# Patient Record
Sex: MALE | Race: OTHER
[De-identification: names, ages, dates, MRNs, and addresses within clinical notes are randomized per-mention and may not be internally consistent; named-entity substitution may affect disease eponyms.]

---

## 2018-04-14 ENCOUNTER — HOSPITAL ENCOUNTER (EMERGENCY)
Dept: HOSPITAL 14 - H.ER | Age: 78
Discharge: HOME | End: 2018-04-14
Payer: SELF-PAY

## 2018-04-14 VITALS
TEMPERATURE: 98.1 F | HEART RATE: 80 BPM | SYSTOLIC BLOOD PRESSURE: 151 MMHG | DIASTOLIC BLOOD PRESSURE: 57 MMHG | OXYGEN SATURATION: 100 % | RESPIRATION RATE: 16 BRPM

## 2018-04-14 DIAGNOSIS — S69.91XA: ICD-10-CM

## 2018-04-14 DIAGNOSIS — S00.81XA: Primary | ICD-10-CM
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
## 2018-04-14 DIAGNOSIS — S00.83XA: ICD-10-CM
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
## 2018-04-14 NOTE — CT
PROCEDURE:  CT MAXILLOFACIAL BONES WITHOUT CONTRAST



HISTORY:

trauma



COMPARISON:

None



TECHNIQUE:

Contiguous axial CT  images of the maxillofacial bones were obtained. 

Coronal and sagittal reformats were generated.



Radiation dose:



Total exam DLP =  mGy-cm.



This CT exam was performed using one or more of the following dose 

reduction techniques: Automated exposure control, adjustment of the 

mA and/or kV according to patient size, and/or use of iterative 

reconstruction technique.



FINDINGS:



NASAL BONES:

Unremarkable.



ORBITS:

Unremarkable.



PARANASAL SINUSES/ MASTOIDS:

Clear.



MAXILLA:

Unremarkable.



MANDIBLE/ TEMPOROMANDIBULAR JOINTS:

Unremarkable.



SKULL BASE:

Unremarkable.



TEMPORAL BONES:

Middle ears and mastoid grossly unremarkable.



OTHER FINDINGS:

None.



IMPRESSION:

Unremarkable non contrast enhanced CT of the maxillofacial bones.

## 2018-04-14 NOTE — ED PDOC
HPI: General Adult


Time Seen by Provider: 04/14/18 12:51


Chief Complaint (Nursing): Trauma


Chief Complaint (Provider): Fall, Head Injury


History Per: Patient


History/Exam Limitations: no limitations


Onset/Duration Of Symptoms: Mins (PTA)


Current Symptoms Are (Timing): Still Present


Additional History Per: EMS


Additional Complaint(s): 





Binu is a 77 y/o male with no past medical history brought to the ED via 

EMS after tripping and falling while walking prior to arrival. Patient states 

that he hit his face but did not lose consciousness, and he denies any 

dizziness before the fall. Patient complains of pain on the right side of his 

face where he sustained most of the impact of the fall. He has no other medical 

complaints. He does not remember date of last Tetanus.





PMD: None





Past Medical History


Reviewed: Historical Data, Nursing Documentation, Vital Signs


Vital Signs: 


 Last Vital Signs











Temp  98.1 F   04/14/18 12:49


 


Pulse  80   04/14/18 12:49


 


Resp  16   04/14/18 12:49


 


BP  151/57 H  04/14/18 12:49


 


Pulse Ox  100   04/14/18 14:17














- Medical History


PMH: No Chronic Diseases





- Family History


Family History: States: Unknown Family Hx





- Immunization History


Hx Tetanus Toxoid Vaccination: No (Unknown)





- Allergies


Allergies/Adverse Reactions: 


 Allergies











Allergy/AdvReac Type Severity Reaction Status Date / Time


 


No Known Allergies Allergy   Verified 04/14/18 12:49














Review of Systems


ROS Statement: Except As Marked, All Systems Reviewed And Found Negative


Musculoskeletal: Positive for: Other (Facial Pain, Right side)





Physical Exam





- Reviewed


Nursing Documentation Reviewed: Yes


Vital Signs Reviewed: Yes





- Physical Exam


Appears: Positive for: Well, Non-toxic, No Acute Distress


Head Exam: Positive for: NORMAL INSPECTION, NORMOCEPHALIC.  Negative for: 

ATRAUMATIC (abrasions on right maxillary region and chin)


Skin: Positive for: Normal Color, Warm, Dry


Extremity: Positive for: Normal ROM.  Negative for: Pedal Edema, Deformity


Neurologic/Psych: Positive for: Alert, Oriented, Gait (steady).  Negative for: 

Motor/Sensory Deficits





- ECG


O2 Sat by Pulse Oximetry: 100 (RA)


Pulse Ox Interpretation: Normal





- Other Rad


  ** CT head and facial bones


X-Ray: Read By Radiologist


X-Ray Interpretation: no acute finding





Medical Decision Making


Medical Decision Making: 





Time: 13:00


Initial Impression: 77 y/o male with head injury status post fall


Initial Plan:


--CT Head w/o Contrast


--CT Maxillofacial w/o Contrast


--XR Left Hand 3 Views


--XR Right Hand 3 Views


--Adacel


--Tylenol





Patient with CT findings, all questions answered. Advised Tylenol for pain. 

Patient was referred to clinic for follow up.


--------------------------------------------------------------------------------

---------------


Scribe Attestation:


Documented by Kyler Nunez, acting as a scribe for Rosie Vallecillo PA-C.





Provider Scribe Attestation:


All medical record entries made by the Scribe were at my direction and 

personally dictated by me. I have reviewed the chart and agree that the record 

accurately reflects my personal performance of the history, physical exam, 

medical decision making, and the department course for this patient. I have 

also personally directed, reviewed, and agree with the discharge instructions 

and disposition.





Disposition





- Clinical Impression


Clinical Impression: 


 Facial abrasion, Head injury, Requires a booster tetanus, Facial contusion








- Patient ED Disposition


Is Patient to be Admitted: No


Counseled Patient/Family Regarding: Studies Performed, Diagnosis, Need For 

Followup





- Disposition


Referrals: 


[facility] [Outside]


Disposition: Routine/Home


Disposition Time: 15:04


Condition: STABLE


Additional Instructions: 


Tylenol for pain as needed. Follow up with clinic in 2-3 days.


Instructions:  Closed Head Injury (DC), Contusion (DC), Skin Abrasions (DC), 

Diphtheria and Tetanus Toxoids, and Acellular Pertussis Vaccine


Forms:  [employer] (Turkish)


Print Language: Luxembourgish

## 2018-04-14 NOTE — RAD
PROCEDURE:  Left Hand Radiographs.



HISTORY:

trauma  



COMPARISON:

None.



FINDINGS:



BONES:

Normal. No fracture. 



JOINTS:

Normal. No osteoarthritic changes. 



SOFT TISSUES:

Normal. 



OTHER FINDINGS:

None.



IMPRESSION:

Normal left hand radiographs.

## 2018-04-14 NOTE — CT
PROCEDURE:  CT HEAD WITHOUT CONTRAST.



HISTORY:

trauma



COMPARISON:

None available. 



TECHNIQUE:

Axial computed tomography images were obtained through the head/brain 

without intravenous contrast.  



Radiation dose:



Total exam DLP =  mGy-cm.



This CT exam was performed using one or more of the following dose 

reduction techniques: Automated exposure control, adjustment of the 

mA and/or kV according to patient size, and/or use of iterative 

reconstruction technique.



FINDINGS:



HEMORRHAGE:

No intracranial hemorrhage. 



BRAIN:

No mass effect or edema.  No atrophy or chronic microvascular 

ischemic changes.



VENTRICLES:

Unremarkable. No hydrocephalus. 



CALVARIUM:

Unremarkable.



PARANASAL SINUSES:

Unremarkable as visualized. No significant inflammatory changes.



MASTOID AIR CELLS:

Unremarkable as visualized. No inflammatory changes.



OTHER FINDINGS:

None.



IMPRESSION:

Normal CT of the Head.

## 2018-04-15 NOTE — RAD
HISTORY:

trauma  



COMPARISON:

No prior



FINDINGS:



BONES:

Normal. No fracture.



JOINTS:

Normal. No osteoarthritis.



SOFT TISSUE:

Normal.



OTHER FINDINGS:

None .



IMPRESSION:

Normal Bone Xray.

## 2019-04-28 ENCOUNTER — HOSPITAL ENCOUNTER (OUTPATIENT)
Dept: HOSPITAL 14 - H.ER | Age: 79
Setting detail: OBSERVATION
LOS: 1 days | Discharge: HOME | End: 2019-04-29
Attending: GENERAL ACUTE CARE HOSPITAL | Admitting: GENERAL ACUTE CARE HOSPITAL
Payer: MEDICAID

## 2019-04-28 DIAGNOSIS — R07.89: Primary | ICD-10-CM

## 2019-04-28 DIAGNOSIS — R97.20: ICD-10-CM

## 2019-04-28 DIAGNOSIS — I35.2: ICD-10-CM

## 2019-04-28 DIAGNOSIS — Z79.82: ICD-10-CM

## 2019-04-28 DIAGNOSIS — I50.41: ICD-10-CM

## 2019-04-28 LAB
ALBUMIN SERPL-MCNC: 3.6 G/DL (ref 3.5–5)
ALBUMIN/GLOB SERPL: 1.3 {RATIO} (ref 1–2.1)
ALT SERPL-CCNC: 32 U/L (ref 21–72)
AST SERPL-CCNC: 39 U/L (ref 17–59)
BACTERIA #/AREA URNS HPF: (no result) /[HPF]
BASOPHILS # BLD AUTO: 0.1 K/UL (ref 0–0.2)
BASOPHILS NFR BLD: 1.1 % (ref 0–2)
BILIRUB UR-MCNC: NEGATIVE MG/DL
BNP SERPL-MCNC: 3130 PG/ML (ref 0–900)
BUN SERPL-MCNC: 23 MG/DL (ref 9–20)
CALCIUM SERPL-MCNC: 8.6 MG/DL (ref 8.4–10.2)
COLOR UR: YELLOW
EOSINOPHIL # BLD AUTO: 0.1 K/UL (ref 0–0.7)
EOSINOPHIL NFR BLD: 2.4 % (ref 0–4)
ERYTHROCYTE [DISTWIDTH] IN BLOOD BY AUTOMATED COUNT: 13.6 % (ref 11.5–14.5)
GFR NON-AFRICAN AMERICAN: > 60
GLUCOSE UR STRIP-MCNC: (no result) MG/DL
HGB BLD-MCNC: 12.8 G/DL (ref 12–18)
LEUKOCYTE ESTERASE UR-ACNC: (no result) LEU/UL
LIPASE SERPL-CCNC: 64 U/L (ref 23–300)
LYMPHOCYTES # BLD AUTO: 1.3 K/UL (ref 1–4.3)
LYMPHOCYTES NFR BLD AUTO: 26.7 % (ref 20–40)
MCH RBC QN AUTO: 30 PG (ref 27–31)
MCHC RBC AUTO-ENTMCNC: 33.6 G/DL (ref 33–37)
MCV RBC AUTO: 89.3 FL (ref 80–94)
MONOCYTES # BLD: 0.4 K/UL (ref 0–0.8)
MONOCYTES NFR BLD: 7.7 % (ref 0–10)
NEUTROPHILS # BLD: 3 K/UL (ref 1.8–7)
NEUTROPHILS NFR BLD AUTO: 62.1 % (ref 50–75)
NRBC BLD AUTO-RTO: 0.1 % (ref 0–0)
PH UR STRIP: 6 [PH] (ref 5–8)
PLATELET # BLD: 159 K/UL (ref 130–400)
PMV BLD AUTO: 8.2 FL (ref 7.2–11.7)
PROT UR STRIP-MCNC: NEGATIVE MG/DL
RBC # BLD AUTO: 4.28 MIL/UL (ref 4.4–5.9)
RBC # UR STRIP: (no result) /UL
SP GR UR STRIP: 1.01 (ref 1–1.03)
SQUAMOUS EPITHIAL: < 1 /HPF (ref 0–5)
URINE CLARITY: CLEAR
UROBILINOGEN UR-MCNC: (no result) MG/DL (ref 0.2–1)
WBC # BLD AUTO: 4.9 K/UL (ref 4.8–10.8)

## 2019-04-28 PROCEDURE — 85025 COMPLETE CBC W/AUTO DIFF WBC: CPT

## 2019-04-28 PROCEDURE — 84153 ASSAY OF PSA TOTAL: CPT

## 2019-04-28 PROCEDURE — 80061 LIPID PANEL: CPT

## 2019-04-28 PROCEDURE — 84443 ASSAY THYROID STIM HORMONE: CPT

## 2019-04-28 PROCEDURE — 84484 ASSAY OF TROPONIN QUANT: CPT

## 2019-04-28 PROCEDURE — 93306 TTE W/DOPPLER COMPLETE: CPT

## 2019-04-28 PROCEDURE — 83690 ASSAY OF LIPASE: CPT

## 2019-04-28 PROCEDURE — 80048 BASIC METABOLIC PNL TOTAL CA: CPT

## 2019-04-28 PROCEDURE — 83735 ASSAY OF MAGNESIUM: CPT

## 2019-04-28 PROCEDURE — 83880 ASSAY OF NATRIURETIC PEPTIDE: CPT

## 2019-04-28 PROCEDURE — 93005 ELECTROCARDIOGRAM TRACING: CPT

## 2019-04-28 PROCEDURE — 36415 COLL VENOUS BLD VENIPUNCTURE: CPT

## 2019-04-28 PROCEDURE — 84100 ASSAY OF PHOSPHORUS: CPT

## 2019-04-28 PROCEDURE — 80053 COMPREHEN METABOLIC PANEL: CPT

## 2019-04-28 PROCEDURE — 99285 EMERGENCY DEPT VISIT HI MDM: CPT

## 2019-04-28 PROCEDURE — 81003 URINALYSIS AUTO W/O SCOPE: CPT

## 2019-04-28 PROCEDURE — 82550 ASSAY OF CK (CPK): CPT

## 2019-04-28 PROCEDURE — 82948 REAGENT STRIP/BLOOD GLUCOSE: CPT

## 2019-04-28 PROCEDURE — 71046 X-RAY EXAM CHEST 2 VIEWS: CPT

## 2019-04-28 RX ADMIN — PANTOPRAZOLE SODIUM SCH MG: 40 TABLET, DELAYED RELEASE ORAL at 14:51

## 2019-04-28 NOTE — CP.PCM.HP
<Kusum Wright - Last Filed: 04/28/19 14:35>





History of Present Illness





- History of Present Illness


History of Present Illness: 





80 y/o male with no significant medical history presented to ED with complaint 

of body pain, dizziness, chest pain, and difficulty breathing for several month 

duration. He states his chest pain is intermittent, 5/10, located at center of 

chest and radiates to the abdomen and is associated with dyspnea. He states that

lifting heavy boxes at work exacerbate symptoms and states that sometimes he 

awakens from his sleep because he cant catch his breath. He uses one pillow at 

night and states he can walk more than 10 blocks and does not get short of 

breath or have chest pain. Also reports cough and muscle aches, states there is 

a sick contact at home. Has not seen a doctor in more than 10 years. Denies 

fevers, chills, vomiting, diarrhea, blood in stool, dysuria, frequency, and 

urgency. 





PMD: None


Medical history: none


Medications: None


Surgical history: none


Social history: Denies smoking history, illicit drug use or alcohol use. 





ED: V/S: BP: 98.5 ; HR: 74 ; 139/52 ; O2 Saturation: 97% on room air 


EKG: sinus rhythm; right bundle branch block (incomplete)


CBC: 4.9 > 12.8 / 38.2 < 159


CXR: Interstitial markings are increased and coarsened with somewhat nodular 

appearance; rule out sequela of reactive/ inflammatory airway disease or vial 

illness verses interstitial pneumonia


Cardiac Monitor


Glucose, Blood, POC: 143


UA: Negative 


Pro-BNP: 3130


TSH: 0.99


PSA: 11.3





Present on Admission





- Present on Admission


Any Indicators Present on Admission: No





Past Patient History





- Past Social History


Alcohol: None


Drugs: Denies





- PSYCHIATRIC


Hx Substance Use: No





- SURGICAL HISTORY


Hx Surgeries: No





- ANESTHESIA


Hx Anesthesia: No





Meds


Allergies/Adverse Reactions: 


                                    Allergies











Allergy/AdvReac Type Severity Reaction Status Date / Time


 


No Known Allergies Allergy   Verified 04/14/18 12:49














Physical Exam





- Constitutional


Appears: Non-toxic, No Acute Distress





- ENT Exam


ENT Exam: Mucous Membranes Moist, Normal Exam





- Neck Exam


Neck exam: Positive for: Normal Inspection





- Respiratory Exam


Respiratory Exam: Clear to Auscultation Bilateral, NORMAL BREATHING PATTERN.  

absent: Accessory Muscle Use, Chest Wall Tenderness, Decreased Breath Sounds, 

Prolonged Expiratory Phase, Rales, Rhonchi, Wheezes, Respiratory Distress, 

Stridor





- Cardiovascular Exam


Cardiovascular Exam: REGULAR RHYTHM, RRR, +S1, +S2.  absent: JVD





- GI/Abdominal Exam


GI & Abdominal Exam: Normal Bowel Sounds, Soft.  absent: Diminished Bowel 

Sounds, Distended, Firm, Hyperactive Bowel Sounds, Rebound, Rigid, Tenderness





- Extremities Exam


Extremities exam: Positive for: normal capillary refill, normal inspection, 

pedal pulses present (+2 dorsalis pedis and +2 tibialis posterior present 

bilateral).  Negative for: calf tenderness, joint swelling, pedal edema, 

tenderness





- Back Exam


Back exam: NORMAL INSPECTION.  absent: CVA tenderness (L), CVA tenderness (R), 

tenderness





- Neurological Exam


Neurological exam: Alert, Oriented x3





- Psychiatric Exam


Psychiatric exam: Normal Affect, Normal Mood





- Skin


Skin Exam: Dry, Intact, Normal Color, Warm





Results





- Vital Signs


Recent Vital Signs: 





                                Last Vital Signs











Temp  98.5 F   04/28/19 10:51


 


Pulse  69   04/28/19 13:28


 


Resp  20   04/28/19 10:51


 


BP  139/52 L  04/28/19 10:51


 


Pulse Ox  97   04/28/19 13:28














- Labs


Result Diagrams: 


                                 04/28/19 11:18





                                 04/28/19 11:18


Labs: 





                         Laboratory Results - last 24 hr











  04/28/19 04/28/19 04/28/19





  11:09 11:18 11:18


 


WBC    4.9


 


RBC    4.28 L


 


Hgb    12.8


 


Hct    38.2


 


MCV    89.3


 


MCH    30.0


 


MCHC    33.6


 


RDW    13.6


 


Plt Count    159


 


MPV    8.2


 


Neut % (Auto)    62.1


 


Lymph % (Auto)    26.7


 


Mono % (Auto)    7.7


 


Eos % (Auto)    2.4


 


Baso % (Auto)    1.1


 


Neut # (Auto)    3.0


 


Lymph # (Auto)    1.3


 


Mono # (Auto)    0.4


 


Eos # (Auto)    0.1


 


Baso # (Auto)    0.1


 


Sodium   137 


 


Potassium   4.0 


 


Chloride   106 


 


Carbon Dioxide   24 


 


Anion Gap   11 


 


BUN   23 H 


 


Creatinine   0.7 L 


 


Est GFR ( Amer)   > 60 


 


Est GFR (Non-Af Amer)   > 60 


 


POC Glucose (mg/dL)  140 H  


 


Random Glucose   143 H 


 


Calcium   8.6 


 


Phosphorus   4.0 


 


Magnesium   2.0 


 


Total Bilirubin   0.6 


 


AST   39 


 


ALT   32 


 


Alkaline Phosphatase   61 


 


Total Creatine Kinase   145 


 


Troponin I   0.0160 


 


NT-Pro-B Natriuret Pep   3130 H 


 


Total Protein   6.3 


 


Albumin   3.6 


 


Globulin   2.7 


 


Albumin/Globulin Ratio   1.3 


 


Lipase   64 


 


Prostate Specific Ag   11.3 H 


 


TSH 3rd Generation   0.99 














Assessment & Plan





- Assessment and Plan (Free Text)


Assessment: 





80 y/o male with no significant medical history presented to ED with general 

complaints, mainly of chest pain, admitted to rule out ACS and CHF as his Pro 

[id number]. 


Plan: 





1. Chest Pain r/o ACS


- r/o CHF- Pro-BNP :


- Admit to Telemetry


- S/P ASA and Nitroglycerin 0.4mg 


- Heart Healthy diet 


- F/U echo


- F/U Chest x-ray final reading


- F/U EKG final reading 


- F/U AM labs 





2. DVT prophylaxis


- SCD and Lovenox





3. Full code





<Mj Duran D - Last Filed: 04/28/19 15:25>





Results





- Vital Signs


Recent Vital Signs: 





                                Last Vital Signs











Temp  98.5 F   04/28/19 10:51


 


Pulse  69   04/28/19 14:58


 


Resp  16   04/28/19 14:53


 


BP  115/52 L  04/28/19 14:53


 


Pulse Ox  97   04/28/19 14:58














- Labs


Result Diagrams: 


                                 04/28/19 11:18





                                 04/28/19 11:18


Labs: 





                         Laboratory Results - last 24 hr











  04/28/19 04/28/19 04/28/19





  11:09 11:18 11:18


 


WBC    4.9


 


RBC    4.28 L


 


Hgb    12.8


 


Hct    38.2


 


MCV    89.3


 


MCH    30.0


 


MCHC    33.6


 


RDW    13.6


 


Plt Count    159


 


MPV    8.2


 


Neut % (Auto)    62.1


 


Lymph % (Auto)    26.7


 


Mono % (Auto)    7.7


 


Eos % (Auto)    2.4


 


Baso % (Auto)    1.1


 


Neut # (Auto)    3.0


 


Lymph # (Auto)    1.3


 


Mono # (Auto)    0.4


 


Eos # (Auto)    0.1


 


Baso # (Auto)    0.1


 


Sodium   137 


 


Potassium   4.0 


 


Chloride   106 


 


Carbon Dioxide   24 


 


Anion Gap   11 


 


BUN   23 H 


 


Creatinine   0.7 L 


 


Est GFR ( Amer)   > 60 


 


Est GFR (Non-Af Amer)   > 60 


 


POC Glucose (mg/dL)  140 H  


 


Random Glucose   143 H 


 


Calcium   8.6 


 


Phosphorus   4.0 


 


Magnesium   2.0 


 


Total Bilirubin   0.6 


 


AST   39 


 


ALT   32 


 


Alkaline Phosphatase   61 


 


Total Creatine Kinase   145 


 


Troponin I   0.0160 


 


NT-Pro-B Natriuret Pep   3130 H 


 


Total Protein   6.3 


 


Albumin   3.6 


 


Globulin   2.7 


 


Albumin/Globulin Ratio   1.3 


 


Lipase   64 


 


Prostate Specific Ag   11.3 H 


 


TSH 3rd Generation   0.99 


 


Urine Color   


 


Urine Clarity   


 


Urine pH   


 


Ur Specific Gravity   


 


Urine Protein   


 


Urine Glucose (UA)   


 


Urine Ketones   


 


Urine Blood   


 


Urine Nitrate   


 


Urine Bilirubin   


 


Urine Urobilinogen   


 


Ur Leukocyte Esterase   


 


Urine RBC (Auto)   


 


Urine Microscopic WBC   


 


Ur Squamous Epith Cells   


 


Urine Bacteria   














  04/28/19





  13:38


 


WBC 


 


RBC 


 


Hgb 


 


Hct 


 


MCV 


 


MCH 


 


MCHC 


 


RDW 


 


Plt Count 


 


MPV 


 


Neut % (Auto) 


 


Lymph % (Auto) 


 


Mono % (Auto) 


 


Eos % (Auto) 


 


Baso % (Auto) 


 


Neut # (Auto) 


 


Lymph # (Auto) 


 


Mono # (Auto) 


 


Eos # (Auto) 


 


Baso # (Auto) 


 


Sodium 


 


Potassium 


 


Chloride 


 


Carbon Dioxide 


 


Anion Gap 


 


BUN 


 


Creatinine 


 


Est GFR ( Amer) 


 


Est GFR (Non-Af Amer) 


 


POC Glucose (mg/dL) 


 


Random Glucose 


 


Calcium 


 


Phosphorus 


 


Magnesium 


 


Total Bilirubin 


 


AST 


 


ALT 


 


Alkaline Phosphatase 


 


Total Creatine Kinase 


 


Troponin I 


 


NT-Pro-B Natriuret Pep 


 


Total Protein 


 


Albumin 


 


Globulin 


 


Albumin/Globulin Ratio 


 


Lipase 


 


Prostate Specific Ag 


 


TSH 3rd Generation 


 


Urine Color  Yellow


 


Urine Clarity  Clear


 


Urine pH  6.0


 


Ur Specific Gravity  1.012


 


Urine Protein  Negative


 


Urine Glucose (UA)  Neg


 


Urine Ketones  Negative


 


Urine Blood  Moderate


 


Urine Nitrate  Negative


 


Urine Bilirubin  Negative


 


Urine Urobilinogen  0.2-1.0


 


Ur Leukocyte Esterase  Neg


 


Urine RBC (Auto)  20 H


 


Urine Microscopic WBC  < 1


 


Ur Squamous Epith Cells  < 1


 


Urine Bacteria  Few H














Attending/Attestation





- Attestation


I have personally seen and examined this patient.: Yes


I have fully participated in the care of the patient.: Yes


I have reviewed all pertinent clinical information: Yes


Notes (Text): 





04/28/19 15:24


Patient seen and examined with resident. Case discussed and agreed with 

assessment and plan of management

## 2019-04-28 NOTE — ED PDOC
HPI: General Adult


Time Seen by Provider: 04/28/19 10:42


Chief Complaint (Nursing): Chest Pain


Chief Complaint (Provider): Body Pain


History Per:  ([id number])


History/Exam Limitations: language barrier (poor historian)


Onset/Duration Of Symptoms: Other (several months)


Current Symptoms Are (Timing): Still Present


Additional Complaint(s): 


Patient is a 78 y/o  male with no significant PMHx who presents to the 

ED for evaluation of various physical complaints including body pain, dizziness,

chest pain, and difficulty breathing for several months. Patient claims his 

symptoms worsen when sleeping at night. Patient reports this is the first time 

he is being seen by a physician for his symptoms. Patient also notes mild cough,

headache, muscle ache, joint pain, and pain around his belly button. Patient 

denies vomiting, diarrhea, blood in stool, trouble urinating, unexplained weight

loss, and prior heart or lung problems. Of note, patient works in a factory 

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





PCP: None Provided





Past Medical History


Reviewed: Historical Data, Nursing Documentation, Vital Signs


Vital Signs: 





                                Last Vital Signs











Temp  98.5 F   04/28/19 10:51


 


Pulse  74   04/28/19 10:51


 


Resp  20   04/28/19 10:51


 


BP  139/52 L  04/28/19 10:51


 


Pulse Ox  97   04/28/19 10:51














- Medical History


PMH: No Chronic Diseases





- Surgical History


Surgical History: No Surg Hx





- Family History


Family History: States: No Known Family Hx





- Social History


Current smoker - smoking cessation education provided: No


Ex-Smoker (has not smoked in the last 12 months): No


Alcohol: None


Drugs: Denies





- Immunization History


Hx Tetanus Toxoid Vaccination: No (Unknown)





- Home Medications


Home Medications: 


                                Ambulatory Orders











 Medication  Instructions  Recorded


 


Aspirin [Aspirin Chewable] 81 mg PO DAILY  chew 04/29/19


 


Aspirin [Garrett Aspirin EC] 81 mg PO DAILY #30 tab 04/29/19


 


Lisinopril [Zestril] 2.5 mg PO DAILY #30 tab 04/29/19


 


Pantoprazole [Protonix EC Tab] 40 mg PO DAILY  ect 04/29/19














- Allergies


Allergies/Adverse Reactions: 


                                    Allergies











Allergy/AdvReac Type Severity Reaction Status Date / Time


 


No Known Allergies Allergy   Verified 04/14/18 12:49














Review of Systems


ROS Statement: Except As Marked, All Systems Reviewed And Found Negative


Constitutional: Negative for: Other (unexplained weight loss)


Cardiovascular: Positive for: Chest Pain


Respiratory: Positive for: Cough (mild), Other (dyspnea)


Gastrointestinal: Positive for: Abdominal Pain (around belly button).  Negative 

for: Vomiting, Diarrhea, Melena


Genitourinary Male: Negative for: Dysuria, Incontinence, Hematuria


Musculoskeletal: Positive for: Other (muscle ache and joint pain)


Neurological: Positive for: Headache, Dizziness





Physical Exam





- Reviewed


Nursing Documentation Reviewed: Yes





- Physical Exam


Appears: Positive for: No Acute Distress (elderly; poor historian)


Head Exam: Positive for: ATRAUMATIC, NORMAL INSPECTION, NORMOCEPHALIC


Skin: Positive for: Normal Color, Warm, DRY


Eye Exam: Positive for: EOMI, Normal appearance, PERRL


Neck: Positive for: Normal, Painless ROM, Supple


Cardiovascular/Chest: Positive for: Regular Rate, Rhythm.  Negative for: Murmur


Respiratory: Positive for: Normal Breath Sounds.  Negative for: Respiratory 

Distress


Gastrointestinal/Abdominal: Positive for: Normal Exam, Soft, Other (umbilicus 

unremarkable).  Negative for: Tenderness (focal), Mass (palpable)


Back: Positive for: Normal Inspection.  Negative for: L CVA Tenderness, R CVA 

Tenderness, Vertebral Tenderness


Extremity: Positive for: Normal ROM.  Negative for: Pedal Edema, Deformity


Neurological/Psych: Positive for: Alert, Symmetric/Intact Strength (5/5), 

Oriented (to self and hospital), Gait (steady)





- Laboratory Results


Result Diagrams: 


                                 04/29/19 04:20





                                 04/29/19 03:25





- ECG


ECG Rhythm: Positive for: Sinus Rhythm, Right Bundle Branch Block (incomplete), 

Nonspecific Changes


Rate: 69


O2 Sat by Pulse Oximetry: 97 (RA)


Pulse Ox Interpretation: Normal





Medical Decision Making


Medical Decision Making: 


Time: 1055


Plan: Given total body pain will consider malignancy vs rhabdomyolysis vs 

thyroid disease vs arthritis vs CAD vs other.


EKG


Blood Work


CBC


CXR


Cardiac Monitor


Glucose, Blood, POC


UA





Time: 1418


FINDINGS:





LUNGS:


The interstitial markings are increased and coarsened with a somewhat micro 

nodular appearance; rule out sequela of reactive/inflammatory airway disease or 

viral illness versus interstitial pneumonia.





PLEURA:


No significant pleural effusion identified. No pneumothorax apparent.





CARDIOVASCULAR:


No aortic atherosclerotic calcification present.





Heart is mildly enlarged..  No pulmonary vascular congestion. 





OSSEOUS STRUCTURES:


Minor multilevel degenerative spondylosis of the thoracic spine





VISUALIZED UPPER ABDOMEN:


Normal.





OTHER FINDINGS:


None.





IMPRESSION:


The interstitial markings are increased and coarsened with a somewhat 

micronodular appearance; rule out sequela of reactive/inflammatory airway di

sease or viral illness versus interstitial pneumonia.


----------------------------------------

---------------------------------------------------------


Scribe Attestation:


Documented by Aaron Friend, acting as a scribe Wild Stephens III, DO. 





Provider Scribe Attestation:


All medical record entries made by the Scribe were at my direction and 

personally dictated by me. I have reviewed the chart and agree that the record 

accurately reflects my personal performance of the history, physical exam, 

medical decision making, and the department course for this patient. I have also

personally directed, reviewed, and agree with the discharge instructions and 

disposition.








Disposition





- Clinical Impression


Clinical Impression: 


 Acute chest pain, Dyspnea








- Patient ED Disposition


Is Patient to be Admitted: Yes





- Disposition


Disposition Time: 13:01


Condition: GOOD





- Pt Status Changed To:


Hospital Disposition Of: Observation

## 2019-04-28 NOTE — RAD
Date of service: 



04/28/2019



HISTORY:

Chest pain, r/o infiltrate 



COMPARISON:

No prior.



TECHNIQUE:

Chest PA and lateral views



FINDINGS:



LUNGS:

The interstitial markings are increased and coarsened with a somewhat 

micro nodular appearance; rule out sequela of reactive/inflammatory 

airway disease or viral illness versus interstitial pneumonia.



PLEURA:

No significant pleural effusion identified. No pneumothorax apparent.



CARDIOVASCULAR:

No aortic atherosclerotic calcification present.



Heart is mildly enlarged..  No pulmonary vascular congestion. 



OSSEOUS STRUCTURES:

Minor multilevel degenerative spondylosis of the thoracic spine



VISUALIZED UPPER ABDOMEN:

Normal.



OTHER FINDINGS:

None.



IMPRESSION:

The interstitial markings are increased and coarsened with a somewhat 

micronodular appearance; rule out sequela of reactive/inflammatory 

airway disease or viral illness versus interstitial pneumonia..

## 2019-04-29 VITALS — TEMPERATURE: 97.7 F | SYSTOLIC BLOOD PRESSURE: 131 MMHG | DIASTOLIC BLOOD PRESSURE: 56 MMHG

## 2019-04-29 VITALS — RESPIRATION RATE: 18 BRPM

## 2019-04-29 LAB
BASOPHILS # BLD AUTO: 0 K/UL (ref 0–0.2)
BASOPHILS NFR BLD: 0.7 % (ref 0–2)
BUN SERPL-MCNC: 23 MG/DL (ref 9–20)
CALCIUM SERPL-MCNC: 8.9 MG/DL (ref 8.4–10.2)
EOSINOPHIL # BLD AUTO: 0.3 K/UL (ref 0–0.7)
EOSINOPHIL NFR BLD: 4.7 % (ref 0–4)
ERYTHROCYTE [DISTWIDTH] IN BLOOD BY AUTOMATED COUNT: 13.6 % (ref 11.5–14.5)
GFR NON-AFRICAN AMERICAN: > 60
HDLC SERPL-MCNC: 45 MG/DL (ref 30–70)
HGB BLD-MCNC: 12.8 G/DL (ref 12–18)
LDLC SERPL-MCNC: 109 MG/DL (ref 0–129)
LYMPHOCYTES # BLD AUTO: 1.7 K/UL (ref 1–4.3)
LYMPHOCYTES NFR BLD AUTO: 31.5 % (ref 20–40)
MCH RBC QN AUTO: 29.7 PG (ref 27–31)
MCHC RBC AUTO-ENTMCNC: 33.1 G/DL (ref 33–37)
MCV RBC AUTO: 89.7 FL (ref 80–94)
MONOCYTES # BLD: 0.4 K/UL (ref 0–0.8)
MONOCYTES NFR BLD: 8.1 % (ref 0–10)
NEUTROPHILS # BLD: 2.9 K/UL (ref 1.8–7)
NEUTROPHILS NFR BLD AUTO: 55 % (ref 50–75)
NRBC BLD AUTO-RTO: 0.1 % (ref 0–0)
PLATELET # BLD: 158 K/UL (ref 130–400)
PMV BLD AUTO: 8.4 FL (ref 7.2–11.7)
RBC # BLD AUTO: 4.31 MIL/UL (ref 4.4–5.9)
WBC # BLD AUTO: 5.3 K/UL (ref 4.8–10.8)

## 2019-04-29 RX ADMIN — PANTOPRAZOLE SODIUM SCH MG: 40 TABLET, DELAYED RELEASE ORAL at 08:41

## 2019-04-29 NOTE — CP.PCM.CON
History of Present Illness





- History of Present Illness


History of Present Illness: 





PT SEEN AND EXAMINED WITH RESIDENTS.  PT HAS OCCASIONAL BOLES, CHEST PRESSURE WITH

EXERTION AND DIZZINESS WITHOUT SYNCOPE.  ECHO REVEALS SEVERE AS AND AI WITH 

DILATED LV.  PT IS COMPENSATED PHYSIOLOGICALLY.  HE DENIES ORTHOPNEA OR PND.  





Review of Systems





- Review of Systems


Systems not reviewed;Unavailable: Language Barrier





Past Patient History





- Past Medical History & Family History


Past Medical History?: No





- Past Social History


Smoking Status: Never Smoked


Chewing Tobacco Use: No


Cigar Use: No


Alcohol: None


Drugs: Denies


Home Situation {Lives}: With Family





- CARDIAC


Hx Cardiac Disorders: No





- PULMONARY


Hx Respiratory Disorders: No





- NEUROLOGICAL


Hx Neurological Disorder: No





- HEENT


Hx HEENT Problems: No





- RENAL


Hx Chronic Kidney Disease: No





- ENDOCRINE/METABOLIC


Hx Endocrine Disorders: No





- HEMATOLOGICAL/ONCOLOGICAL


Hx Blood Disorders: No





- INTEGUMENTARY


Hx Dermatological Problems: No





- MUSCULOSKELETAL/RHEUMATOLOGICAL


Hx Musculoskeletal Disorders: No





- GENITOURINARY/GYNECOLOGICAL


Hx Genitourinary Disorders: No





- PSYCHIATRIC


Hx Psychophysiologic Disorder: No





- SURGICAL HISTORY


Hx Surgeries: No





- ANESTHESIA


Hx Anesthesia: No


Hx Anesthesia Reactions: No


Hx Malignant Hyperthermia: No


Has any member of the family had a problem w/ anesthesia?: No (unknown)





Meds


Home Medications: 


                              Home Medication List











 Medication  Instructions  Recorded  Confirmed  Type


 


Aspirin [Aspirin Chewable] 81 mg PO DAILY  chew 04/29/19  Rx


 


Aspirin [[place] Aspirin EC] 81 mg PO DAILY #30 tab 04/29/19  Rx


 


Lisinopril [Zestril] 2.5 mg PO DAILY #30 tab 04/29/19  Rx


 


Pantoprazole [Protonix EC Tab] 40 mg PO DAILY  ect 04/29/19  Rx











Allergies/Adverse Reactions: 


                                    Allergies











Allergy/AdvReac Type Severity Reaction Status Date / Time


 


No Known Allergies Allergy   Verified 04/14/18 12:49














- Medications


Medications: 


                               Current Medications





Aspirin (Aspirin Chewable)  81 mg PO DAILY [facility]


   Last Admin: 04/29/19 08:41 Dose:  81 mg


Enoxaparin Sodium (Lovenox)  40 mg SC DAILY [facility]; Protocol


   Last Admin: 04/29/19 08:40 Dose:  40 mg


Lisinopril (Zestril)  2.5 mg PO DAILY [facility]


   Last Admin: 04/29/19 08:41 Dose:  2.5 mg


Nitroglycerin (Nitrostat Sl Tab)  0.4 mg SL Q5M PRN


   PRN Reason: chest pain


Pantoprazole Sodium (Protonix Ec Tab)  40 mg PO DAILY [facility]


   Last Admin: 04/29/19 08:41 Dose:  40 mg











Physical Exam





- Constitutional


Appears: Non-toxic





- Head Exam


Head Exam: ATRAUMATIC, NORMAL INSPECTION, NORMOCEPHALIC





- Eye Exam


Eye Exam: EOMI, Normal appearance, PERRL.  absent: Conjunctival injection, 

Nystagmus, Periorbital swelling, Periorbital tenderness, Scleral icterus


Pupil Exam: NORMAL ACCOMODATION, PERRL.  absent: Fixed, Irregular, Miosis, 

Mydriatic, Unequal





- ENT Exam


ENT Exam: Mucous Membranes Moist, Normal Exam.  absent: Mucous Membranes Dry, 

Normal External Ear Exam, Normal Oropharynx, TM's Normal Bilaterally





- Neck Exam


Neck exam: Positive for: Normal Inspection





- Respiratory Exam


Respiratory Exam: Clear to Auscultation Bilateral, NORMAL BREATHING PATTERN





- Cardiovascular Exam


Cardiovascular Exam: REGULAR RHYTHM, +S1, +S2, Systolic Murmur


Additional comments: 





THRILL NOTED





- GI/Abdominal Exam


GI & Abdominal Exam: Normal Bowel Sounds, Soft.  absent: Bruit, Diminished Bowel

Sounds, Distended, Firm, Guarding, Hernia, Hyperactive Bowel Sounds, Hypoactive 

Bowel Sounds, Mass, Organomegaly, Pulsatile Mass, Rebound, Rigid, Tenderness





- Rectal Exam


Rectal Exam: Deferred





- Extremities Exam


Extremities exam: Positive for: normal inspection.  Negative for: calf tendern

ess, full ROM, joint swelling, normal capillary refill, pedal edema, tenderness,

pedal pulses present





- Back Exam


Back exam: NORMAL INSPECTION





- Neurological Exam


Neurological exam: Alert, CN II-XII Intact, Normal Gait, Oriented x3, Reflexes 

Normal





- Psychiatric Exam


Psychiatric exam: Normal Affect, Normal Mood





- Skin


Skin Exam: Dry, Intact, Normal Color, Warm





Results





- Vital Signs


Recent Vital Signs: 


                                Last Vital Signs











Temp  97.7 F   04/29/19 15:34


 


Pulse  65   04/29/19 15:34


 


Resp  18   04/29/19 15:34


 


BP  131/56 L  04/29/19 15:34


 


Pulse Ox  99   04/29/19 15:34














- Labs


Result Diagrams: 


                                 04/29/19 04:20





                                 04/29/19 03:25


Labs: 


                         Laboratory Results - last 24 hr











  04/28/19 04/29/19 04/29/19





  19:00 03:25 04:20


 


WBC    5.3


 


RBC    4.31 L


 


Hgb    12.8


 


Hct    38.6


 


MCV    89.7


 


MCH    29.7


 


MCHC    33.1


 


RDW    13.6


 


Plt Count    158


 


MPV    8.4


 


Neut % (Auto)    55.0


 


Lymph % (Auto)    31.5


 


Mono % (Auto)    8.1


 


Eos % (Auto)    4.7 H


 


Baso % (Auto)    0.7


 


Neut # (Auto)    2.9


 


Lymph # (Auto)    1.7


 


Mono # (Auto)    0.4


 


Eos # (Auto)    0.3


 


Baso # (Auto)    0.0


 


Sodium   136 


 


Potassium   3.9 


 


Chloride   107 


 


Carbon Dioxide   23 


 


Anion Gap   10 


 


BUN   23 H 


 


Creatinine   0.7 L 


 


Est GFR ( Amer)   > 60 


 


Est GFR (Non-Af Amer)   > 60 


 


Random Glucose   86 


 


Calcium   8.9 


 


Troponin I  0.0120  0.0140 


 


Triglycerides   62 


 


Cholesterol   188 


 


LDL Cholesterol Direct   109 


 


HDL Cholesterol   45 














- EKG Data


EKG Interpreted by: Myself


EKG shows normal: Sinus rhythm


Rate: Normal





Assessment & Plan


(1) BOLES (dyspnea on exertion)


Status: Acute   





(2) Aortic stenosis, severe


Status: Acute   





(3) Aortic valve insufficiency, acquired


Status: Acute   





(4) Chest pain


Status: Acute   





- Assessment and Plan (Free Text)


Plan: 





PT NEEDS REFERRAL FOR AV REPLACEMENT.  HE HAS RULED OUT FOR MI.  WILL D/C TO 

HOME, F/U WITH RESIDENT CLINIC IN 2 DAYS.  APPOINTMENT MADE AND GIVEN.  WILL 

THEN REFER PT TO CTS AT [facility] FROM THE CLINIC.  LOW DOSE ACEI.  NO DIURETICS

## 2019-04-29 NOTE — CP.PCM.PN
<Kusum Wright - Last Filed: 04/29/19 10:55>





Subjective





- Date & Time of Evaluation


Date of Evaluation: 04/29/19


Time of Evaluation: 09:21





- Subjective


Subjective: 


Patient seen and examined at bedside. Denies any complaints. Denies chest pain, 

shortness of breath, nausea, vomiting, abdominal pain, and diarrhea. Reports no

cturia symptoms but denies dysuria, frequency, urgency and difficulty initiating

urination. 





Objective





- Vital Signs/Intake and Output


Vital Signs (last 24 hours): 


                                        











Temp Pulse Resp BP Pulse Ox


 


 98.1 F   69   18   113/56 L  96 


 


 04/29/19 08:01  04/29/19 08:41  04/29/19 08:01  04/29/19 08:46  04/29/19 08:01











- Medications


Medications: 


                               Current Medications





Aspirin (Aspirin Chewable)  81 mg PO DAILY [facility]


   Last Admin: 04/29/19 08:41 Dose:  81 mg


Enoxaparin Sodium (Lovenox)  40 mg SC DAILY [facility]; Protocol


   Last Admin: 04/29/19 08:40 Dose:  40 mg


Lisinopril (Zestril)  2.5 mg PO DAILY [facility]


   Last Admin: 04/29/19 08:41 Dose:  2.5 mg


Nitroglycerin (Nitrostat Sl Tab)  0.4 mg SL Q5M PRN


   PRN Reason: chest pain


Pantoprazole Sodium (Protonix Ec Tab)  40 mg PO DAILY [facility]


   Last Admin: 04/29/19 08:41 Dose:  40 mg











- Labs


Labs: 


                                        





                                 04/29/19 04:20 





                                 04/29/19 03:25 











- Constitutional


Appears: Non-toxic, No Acute Distress, Older Than Stated Age





- Eye Exam


Additional comments: 





Swollen eyelids.





- Respiratory Exam


Respiratory Exam: Clear to Ausculation Bilateral, NORMAL BREATHING PATTERN.  

absent: Accessory Muscle Use, Chest Wall Tenderness, Decreased Breath Sounds, 

Prolonged Expiratory Phase, Rales, Rhonchi, Wheezes, Respiratory Distress, 

Stridor





- Cardiovascular Exam


Cardiovascular Exam: +S1, +S2, Murmur (significant systolic murmur greater noted

over aortic listening post. Thrill present on aortic spot)





- GI/Abdominal Exam


GI & Abdominal Exam: Soft, Normal Bowel Sounds.  absent: Distended, Firm, 

Guarding, Rigid, Tenderness, Rebound





- Extremities Exam


Extremities Exam: Normal Capillary Refill, Normal Inspection.  absent: Calf 

Tenderness, Joint Swelling, Pedal Edema, Tenderness





- Neurological Exam


Neurological Exam: Alert, Awake, Oriented x3





- Psychiatric Exam


Psychiatric exam: Normal Affect, Normal Mood





- Skin


Skin Exam: Dry, Intact, Normal Color, Warm





Assessment and Plan





- Assessment and Plan (Free Text)


Assessment: 





78 y/o male with no significant medical history presented to ED with general 

complaints, mainly of chest pain, admitted to rule out ACS and CHF as his Pro 

BNP 3130; Significant systolic murmur. 


Plan: 





1. Chest Pain r/o ACS


- r/o CHF- Pro-BNP :3130


- S/P ASA and Nitroglycerin 0.4mg 


- Cardiology consult appreciated with Dr. Almeida


- Troponins x3 negative 


- Significant Systolic murmur on exam


- F/U ECHO


- Chest x-ray: Interstitial markings are increased and coarsened with 

micronodular appearance; rule out sequela or reactive/ inflammatory airway 

disease or viral illnes vs. interstitial pneumonia. 


- EKG final read: NSR with PVC's; Left axis deviation; LVH with repolarization 

abnormality 


- F/U repeat EKG


- F/U AM labs 





2. Elevated PSA


- To be follow up as outpatient





3 . DVT prophylaxis


- SCD and Lovenox





4. Full code





<Yojana Ruiz - Last Filed: 04/29/19 16:36>





Objective





- Vital Signs/Intake and Output


Vital Signs (last 24 hours): 


                                        











Temp Pulse Resp BP Pulse Ox


 


 97.7 F   65   18   131/56 L  99 


 


 04/29/19 15:34  04/29/19 15:34  04/29/19 15:34  04/29/19 15:34  04/29/19 15:34











- Medications


Medications: 


                               Current Medications





Aspirin (Aspirin Chewable)  81 mg PO DAILY [facility]


   Last Admin: 04/29/19 08:41 Dose:  81 mg


Enoxaparin Sodium (Lovenox)  40 mg SC DAILY [facility]; Protocol


   Last Admin: 04/29/19 08:40 Dose:  40 mg


Lisinopril (Zestril)  2.5 mg PO DAILY [facility]


   Last Admin: 04/29/19 08:41 Dose:  2.5 mg


Nitroglycerin (Nitrostat Sl Tab)  0.4 mg SL Q5M PRN


   PRN Reason: chest pain


Pantoprazole Sodium (Protonix Ec Tab)  40 mg PO DAILY [facility]


   Last Admin: 04/29/19 08:41 Dose:  40 mg











- Labs


Labs: 


                                        





                                 04/29/19 04:20 





                                 04/29/19 03:25 











Attending/Attestation





- Attestation


I have personally seen and examined this patient.: Yes


I have fully participated in the care of the patient.: Yes


I have reviewed all pertinent clinical information, including history, physical 

exam and plan: Yes


Notes (Text): 











Chest Pain, ACS ruled out, Pain prob due to Severe aortic stenosis


Severe Aortic Stenosis and Aortic Regurgitation


Acute CHF, Systolic and Diastolic Dysfunction


Elevated PSA





- Pt came in bec of CP and SOB, w/c has now resolved


-On exam - noted loud pansystolic murmur  radiating to the back


- gentle diuresis with Lasix x 1 dose


- start low dose ACE inhibitor


- Cardiology consult


- Pt may need Valve replacement 


- further Prostate work up as outpt, pt is asymptomatic  ( no urinary sxs )

## 2019-04-29 NOTE — CP.PCM.DIS
<Kusum Wright - Last Filed: 04/29/19 17:36>





Provider





- Provider


Date of Admission: 


04/28/19 13:37





Attending physician: 


Mj Duran MD





Consults: 








04/28/19 17:46


Pastoral Care Referral Routine 


   Comment: 


   Physician Instructions: 


   Reason For Exam: spiritual support





04/28/19 17:47


Social Work Referral Routine 


   Comment: none


   Physician Instructions: safe discharge.


   Reason For Exam: lives alone.





04/29/19 09:18


Cardiology Consult Routine 


   Comment: 


   Consulting Provider: Jessica Almeida


   Consulting Physician: Jessica Almeida


   Reason for Consult: CP. Aortic stenosis?











Time Spent in preparation of Discharge (in minutes): 30





Diagnosis





- Discharge Diagnosis


(1) Chest pain


Status: Acute   


Comment: - r/o CHF- Pro-BNP :3130.  - S/P ASA and Nitroglycerin 0.4mg.  - 

Cardiology consult appreciated with Dr. Almeida.  - Troponins x3 negative.  - 

Significant Systolic murmur on exam.  - Chest x-ray: Interstitial markings are 

increased and coarsened with micronodular appearance; rule out sequela or 

reactive/ inflammatory airway disease or viral illnes vs. interstitial 

pneumonia.  - EKG final read: NSR with PVC's; Left axis deviation; LVH with 

repolarization abnormality.  ECHO:EF of 45-50%, Severe Aortic regurgitation, and

severe aortic valvular stenosis   





(2) Aortic stenosis, severe


Status: Acute   


Comment: ECHO demonstrated EF of 45-50%, Severe Aortic regurgitation, and severe

aortic valvular stenosis.  Patient will have close follow up in [facility] (appointment

scheduled for 5/2/19 @ 9:00 am with Dr. Yarbrough).  Patient needs to have valve

replaced   





(3) Aortic valve insufficiency, acquired


Status: Acute   


Comment: ECHO demonstrated EF of 45-50%, Severe Aortic regurgitation, and severe

aortic valvular stenosis.  Patient will have close follow up in [facility] (appointment

scheduled for 5/2/19 @ 9:00 am with Dr. Yarbrough).  Patient needs to have valve

replaced   





(4) Elevated PSA measurement


Status: Acute   


Comment: PSA: 11.  to be follow up as outpt   





Hospital Course





- Lab Results


Lab Results: 


                             Most Recent Lab Values











WBC  5.3 K/uL (4.8-10.8)   04/29/19  04:20    


 


RBC  4.31 Mil/uL (4.40-5.90)  L  04/29/19  04:20    


 


Hgb  12.8 g/dL (12.0-18.0)   04/29/19  04:20    


 


Hct  38.6 % (35.0-51.0)   04/29/19  04:20    


 


MCV  89.7 fl (80.0-94.0)   04/29/19  04:20    


 


MCH  29.7 pg (27.0-31.0)   04/29/19  04:20    


 


MCHC  33.1 g/dL (33.0-37.0)   04/29/19  04:20    


 


RDW  13.6 % (11.5-14.5)   04/29/19  04:20    


 


Plt Count  158 K/uL (130-400)   04/29/19  04:20    


 


MPV  8.4 fl (7.2-11.7)   04/29/19  04:20    


 


Neut % (Auto)  55.0 % (50.0-75.0)   04/29/19  04:20    


 


Lymph % (Auto)  31.5 % (20.0-40.0)   04/29/19  04:20    


 


Mono % (Auto)  8.1 % (0.0-10.0)   04/29/19  04:20    


 


Eos % (Auto)  4.7 % (0.0-4.0)  H  04/29/19  04:20    


 


Baso % (Auto)  0.7 % (0.0-2.0)   04/29/19  04:20    


 


Neut # (Auto)  2.9 K/uL (1.8-7.0)   04/29/19  04:20    


 


Lymph # (Auto)  1.7 K/uL (1.0-4.3)   04/29/19  04:20    


 


Mono # (Auto)  0.4 K/uL (0.0-0.8)   04/29/19  04:20    


 


Eos # (Auto)  0.3 K/uL (0.0-0.7)   04/29/19  04:20    


 


Baso # (Auto)  0.0 K/uL (0.0-0.2)   04/29/19  04:20    


 


Sodium  136 mmol/l (132-148)   04/29/19  03:25    


 


Potassium  3.9 MMOL/L (3.6-5.0)   04/29/19  03:25    


 


Chloride  107 mmol/L ()   04/29/19  03:25    


 


Carbon Dioxide  23 mmol/L (22-30)   04/29/19  03:25    


 


Anion Gap  10  (10-20)   04/29/19  03:25    


 


BUN  23 mg/dl (9-20)  H  04/29/19  03:25    


 


Creatinine  0.7 mg/dl (0.8-1.5)  L  04/29/19  03:25    


 


Est GFR ( Amer)  > 60   04/29/19  03:25    


 


Est GFR (Non-Af Amer)  > 60   04/29/19  03:25    


 


POC Glucose (mg/dL)  140 mg/dL ()  H  04/28/19  11:09    


 


Random Glucose  86 mg/dL ()   04/29/19  03:25    


 


Calcium  8.9 mg/dL (8.4-10.2)   04/29/19  03:25    


 


Phosphorus  4.0 mg/dl (2.5-4.5)   04/28/19  11:18    


 


Magnesium  2.0 MG/DL (1.6-2.3)   04/28/19  11:18    


 


Total Bilirubin  0.6 mg/dl (0.2-1.3)   04/28/19  11:18    


 


AST  39 U/L (17-59)   04/28/19  11:18    


 


ALT  32 U/L (21-72)   04/28/19  11:18    


 


Alkaline Phosphatase  61 U/L ()   04/28/19  11:18    


 


Total Creatine Kinase  145 U/L ()   04/28/19  11:18    


 


Troponin I  0.0140 ng/mL (0.00-0.120)   04/29/19  03:25    


 


NT-Pro-B Natriuret Pep  3130 pg/ml (0-900)  H  04/28/19  11:18    


 


Total Protein  6.3 G/DL (6.3-8.2)   04/28/19  11:18    


 


Albumin  3.6 g/dL (3.5-5.0)   04/28/19  11:18    


 


Globulin  2.7 gm/dL (2.2-3.9)   04/28/19  11:18    


 


Albumin/Globulin Ratio  1.3  (1.0-2.1)   04/28/19  11:18    


 


Triglycerides  62 mg/DL (0-149)   04/29/19  03:25    


 


Cholesterol  188 mg/dL (0-199)   04/29/19  03:25    


 


LDL Cholesterol Direct  109 mg/dL (0-129)   04/29/19  03:25    


 


HDL Cholesterol  45 MG/DL (30-70)   04/29/19  03:25    


 


Lipase  64 U/L ()   04/28/19  11:18    


 


Prostate Specific Ag  11.3 ng/ML (0.00-4.0)  H  04/28/19  11:18    


 


TSH 3rd Generation  0.99 mIU/ML (0.46-4.68)   04/28/19  11:18    


 


Urine Color  Yellow  (YELLOW)   04/28/19  13:38    


 


Urine Clarity  Clear  (Clear)   04/28/19  13:38    


 


Urine pH  6.0  (5.0-8.0)   04/28/19  13:38    


 


Ur Specific Gravity  1.012  (1.003-1.030)   04/28/19  13:38    


 


Urine Protein  Negative mg/dL (NEGATIVE)   04/28/19  13:38    


 


Urine Glucose (UA)  Neg mg/dL (NEGATIVE)   04/28/19  13:38    


 


Urine Ketones  Negative mg/dL (NEGATIVE)   04/28/19  13:38    


 


Urine Blood  Moderate  (NEGATIVE)   04/28/19  13:38    


 


Urine Nitrate  Negative  (NEGATIVE)   04/28/19  13:38    


 


Urine Bilirubin  Negative  (NEGATIVE)   04/28/19  13:38    


 


Urine Urobilinogen  0.2-1.0 mg/dL (0.2-1.0)   04/28/19  13:38    


 


Ur Leukocyte Esterase  Neg Michael/uL (Negative)   04/28/19  13:38    


 


Urine RBC (Auto)  20 /hpf (0-3)  H  04/28/19  13:38    


 


Urine Microscopic WBC  < 1 /hpf (0-5)   04/28/19  13:38    


 


Ur Squamous Epith Cells  < 1 /hpf (0-5)   04/28/19  13:38    


 


Urine Bacteria  Few  (<OCC)  H  04/28/19  13:38    














- Hospital Course


Hospital Course: 





80 y/o male with no significant medical history presented to ED with general 

complaints, mainly of chest pain, admitted to rule out ACS and CHF as his Pro 

[id number]. Patient was found to have a significant systolic murmur on physical 

exam with a thrill on the aortic auscultation position. Cardiology was consulted

at that time. Troponins negative x3. ECHO demonstrated EF of 45-50%, Severe 

Aortic regurgitation, and severe aortic valvular stenosis. EKG: NSR with PVC's; 

Left axis deviation; LVH with repolarization abnormality. Patient has close 

follow up scheduled with Dr. Yarbrough in [facility], 5/2/19 @ 9am for referral to 

Cardiac surgery at [facility]. Discharge with prescriptions for lisinopril 2.5mg and 

Aspirin 81mg. 





Discharge Exam





- Head Exam


Head Exam: ATRAUMATIC, NORMAL INSPECTION, NORMOCEPHALIC





- ENT Exam


ENT Exam: Mucous Membranes Moist





- Respiratory Exam


Respiratory Exam: Clear to PA & Lateral, NORMAL BREATHING PATTERN, UNREMARKABLE





- Cardiovascular Exam


Cardiovascular Exam: REGULAR RHYTHM, +S1, +S2, Systolic Murmur (significant 

systolic murmur with thrill present on loudest on Aortic auscultation area. )





- GI/Abdominal Exam


GI & Abdominal Exam: Normal Bowel Sounds, Soft, Unremarkable.  absent: 

Distended, Firm, Guarding, Rebound, Rigid





- Extremities Exam


Extremities exam: normal inspection, pedal pulses present (+2 dorsalis pedis and

tibialis pulses present bilaterally. )





- Neurological Exam


Neurological exam: Alert, Oriented x3





- Psychiatric Exam


Psychiatric exam: Normal Affect, Normal Mood





- Skin


Skin Exam: Dry, Intact, Normal Color, Warm





Discharge Plan





- Discharge Medications


Prescriptions: 


RX: Aspirin [Plum Aspirin EC] 81 mg PO DAILY #30 tab


RX: Lisinopril [Zestril] 2.5 mg PO DAILY #30 tab





- Follow Up Plan


Condition: GOOD


Disposition: HOME/ ROUTINE


Patient education suggested?: Yes


Instructions:  Chest Pain (DC)


Additional Instructions: 


follow up appt in the clinic on 5/62/19 @ 9 : 00 am with Dr. Baez 


Referrals: 


[facility], located within [facility] [Outside]





<Yojana Ruiz - Last Filed: 04/29/19 17:54>





Provider





- Provider


Date of Admission: 


04/28/19 13:37





Attending physician: 


Mj Duran MD





Consults: 








04/28/19 17:46


Pastoral Care Referral Routine 


   Comment: 


   Physician Instructions: 


   Reason For Exam: spiritual support





04/28/19 17:47


Social Work Referral Routine 


   Comment: none


   Physician Instructions: safe discharge.


   Reason For Exam: lives alone.





04/29/19 09:18


Cardiology Consult Routine 


   Comment: 


   Consulting Provider: Jessica Almeida


   Consulting Physician: Jessica Almeida


   Reason for Consult: CP. Aortic stenosis?














Hospital Course





- Lab Results


Lab Results: 


                             Most Recent Lab Values











WBC  5.3 K/uL (4.8-10.8)   04/29/19  04:20    


 


RBC  4.31 Mil/uL (4.40-5.90)  L  04/29/19  04:20    


 


Hgb  12.8 g/dL (12.0-18.0)   04/29/19  04:20    


 


Hct  38.6 % (35.0-51.0)   04/29/19  04:20    


 


MCV  89.7 fl (80.0-94.0)   04/29/19  04:20    


 


MCH  29.7 pg (27.0-31.0)   04/29/19  04:20    


 


MCHC  33.1 g/dL (33.0-37.0)   04/29/19  04:20    


 


RDW  13.6 % (11.5-14.5)   04/29/19  04:20    


 


Plt Count  158 K/uL (130-400)   04/29/19  04:20    


 


MPV  8.4 fl (7.2-11.7)   04/29/19  04:20    


 


Neut % (Auto)  55.0 % (50.0-75.0)   04/29/19  04:20    


 


Lymph % (Auto)  31.5 % (20.0-40.0)   04/29/19  04:20    


 


Mono % (Auto)  8.1 % (0.0-10.0)   04/29/19  04:20    


 


Eos % (Auto)  4.7 % (0.0-4.0)  H  04/29/19  04:20    


 


Baso % (Auto)  0.7 % (0.0-2.0)   04/29/19  04:20    


 


Neut # (Auto)  2.9 K/uL (1.8-7.0)   04/29/19  04:20    


 


Lymph # (Auto)  1.7 K/uL (1.0-4.3)   04/29/19  04:20    


 


Mono # (Auto)  0.4 K/uL (0.0-0.8)   04/29/19  04:20    


 


Eos # (Auto)  0.3 K/uL (0.0-0.7)   04/29/19  04:20    


 


Baso # (Auto)  0.0 K/uL (0.0-0.2)   04/29/19  04:20    


 


Sodium  136 mmol/l (132-148)   04/29/19  03:25    


 


Potassium  3.9 MMOL/L (3.6-5.0)   04/29/19  03:25    


 


Chloride  107 mmol/L ()   04/29/19  03:25    


 


Carbon Dioxide  23 mmol/L (22-30)   04/29/19  03:25    


 


Anion Gap  10  (10-20)   04/29/19  03:25    


 


BUN  23 mg/dl (9-20)  H  04/29/19  03:25    


 


Creatinine  0.7 mg/dl (0.8-1.5)  L  04/29/19  03:25    


 


Est GFR ( Amer)  > 60   04/29/19  03:25    


 


Est GFR (Non-Af Amer)  > 60   04/29/19  03:25    


 


POC Glucose (mg/dL)  140 mg/dL ()  H  04/28/19  11:09    


 


Random Glucose  86 mg/dL ()   04/29/19  03:25    


 


Calcium  8.9 mg/dL (8.4-10.2)   04/29/19  03:25    


 


Phosphorus  4.0 mg/dl (2.5-4.5)   04/28/19  11:18    


 


Magnesium  2.0 MG/DL (1.6-2.3)   04/28/19  11:18    


 


Total Bilirubin  0.6 mg/dl (0.2-1.3)   04/28/19  11:18    


 


AST  39 U/L (17-59)   04/28/19  11:18    


 


ALT  32 U/L (21-72)   04/28/19  11:18    


 


Alkaline Phosphatase  61 U/L ()   04/28/19  11:18    


 


Total Creatine Kinase  145 U/L ()   04/28/19  11:18    


 


Troponin I  0.0140 ng/mL (0.00-0.120)   04/29/19  03:25    


 


NT-Pro-B Natriuret Pep  3130 pg/ml (0-900)  H  04/28/19  11:18    


 


Total Protein  6.3 G/DL (6.3-8.2)   04/28/19  11:18    


 


Albumin  3.6 g/dL (3.5-5.0)   04/28/19  11:18    


 


Globulin  2.7 gm/dL (2.2-3.9)   04/28/19  11:18    


 


Albumin/Globulin Ratio  1.3  (1.0-2.1)   04/28/19  11:18    


 


Triglycerides  62 mg/DL (0-149)   04/29/19  03:25    


 


Cholesterol  188 mg/dL (0-199)   04/29/19  03:25    


 


LDL Cholesterol Direct  109 mg/dL (0-129)   04/29/19  03:25    


 


HDL Cholesterol  45 MG/DL (30-70)   04/29/19  03:25    


 


Lipase  64 U/L ()   04/28/19  11:18    


 


Prostate Specific Ag  11.3 ng/ML (0.00-4.0)  H  04/28/19  11:18    


 


TSH 3rd Generation  0.99 mIU/ML (0.46-4.68)   04/28/19  11:18    


 


Urine Color  Yellow  (YELLOW)   04/28/19  13:38    


 


Urine Clarity  Clear  (Clear)   04/28/19  13:38    


 


Urine pH  6.0  (5.0-8.0)   04/28/19  13:38    


 


Ur Specific Gravity  1.012  (1.003-1.030)   04/28/19  13:38    


 


Urine Protein  Negative mg/dL (NEGATIVE)   04/28/19  13:38    


 


Urine Glucose (UA)  Neg mg/dL (NEGATIVE)   04/28/19  13:38    


 


Urine Ketones  Negative mg/dL (NEGATIVE)   04/28/19  13:38    


 


Urine Blood  Moderate  (NEGATIVE)   04/28/19  13:38    


 


Urine Nitrate  Negative  (NEGATIVE)   04/28/19  13:38    


 


Urine Bilirubin  Negative  (NEGATIVE)   04/28/19  13:38    


 


Urine Urobilinogen  0.2-1.0 mg/dL (0.2-1.0)   04/28/19  13:38    


 


Ur Leukocyte Esterase  Neg Michael/uL (Negative)   04/28/19  13:38    


 


Urine RBC (Auto)  20 /hpf (0-3)  H  04/28/19  13:38    


 


Urine Microscopic WBC  < 1 /hpf (0-5)   04/28/19  13:38    


 


Ur Squamous Epith Cells  < 1 /hpf (0-5)   04/28/19  13:38    


 


Urine Bacteria  Few  (<OCC)  H  04/28/19  13:38    














Attending/Attestation





- Attestation


I have personally seen and examined this patient.: Yes


I have fully participated in the care of the patient.: Yes


I have reviewed all pertinent clinical information, including history, physical 

exam and plan: Yes


Notes (Text): 








Chest Pain, ACS ruled out, Pain prob due to Severe aortic stenosis


Severe Aortic Stenosis and Mod to Severe Aortic Regurgitation


Acute CHF, Systolic and Diastolic Dysfunction


Elevated PSA





- Trop x 3 negative


- will d/c pt home on ASA and low dose Lisinopril


-Pt will ff up at the [facility] , he will be referred to [facility] for 

poss Aortic Valve replacement


- further Prostate work up as outpt


- Treatment and D/C Plan discussed with pt in detail by [place] speaking 

residents ( Dr Yarbrough and Dr Dixon)

## 2019-04-29 NOTE — CARD
--------------- APPROVED REPORT --------------





Date of service: 04/29/2019



EXAM: Two-dimensional and M-mode echocardiogram with Doppler and 

color Doppler.



Other Information 

Quality : GoodRhythm : NSR



INDICATION

Congestive Heart Failure 



2D DIMENSIONS 

IVSd1.31   (0.7-1.1cm)LVDd6.00   (3.9-5.9cm)

LVOT Diameter1.81   (1.8-2.4cm)PWd0.95   (0.7-1.1cm)

IVSs1.46   (0.8-1.2cm)LVDs4.43   (2.5-4.0cm)

FS (%) 26.2   %PWs1.14   (0.8-1.2cm)



M-Mode DIMENSIONS 

Left Atrium (MM)4.24   (2.5-4.0cm)IVSd0.88   (0.7-1.1cm)

Aortic Root2.71   (2.2-3.7cm)LVDd7.62   (4.0-5.6cm)

Aortic Cusp Exc.0.74   (1.5-2.0cm)PWd1.15   (0.7-1.1cm)

IVSs1.47   cmFS (%) 36   %

LVDs4.91   (2.0-3.8cm)PWs1.59   cm



Aortic Valve

AoV Peak Bdjjmyuy776.5cm/sAoV VTI89.9cmAO Peak GR.67mmHg

LVOT Peak Kohfqedv732.5cm/sLVOT VTI32.35cmAO Mean GR.39mmHg

OTIS (VMAX)0.09qv1XVW (VTI)0.19dg7FK P 1/2 Xyqs208bn



Mitral Valve

MV E Urmqkayg60.0cm/sMV DECEL [id number] A Hmbogfur11.4cm/s

MV YPR07gsK/A ratio1.4MVA (PHT)5.02cm2



TDI

Lateral E' Peak V10.43cm/sMedial E' Peak V6.52cm/sE/Lateral E'9.5

E/Medial E'15.2



 LEFT VENTRICLE 

The Left Ventricle is mildly dilated.

There is normal left ventricular wall thickness.

The LV systolic function is mildly impaired.

The estimated ejection fraction is 45-50%

There is global hypokinesis of the left ventricle.

Transmitral Doppler flow pattern is Grade II-pseudonormal filling 

dynamics.

No left ventricle thrombus noted on this study.

There is no ventricular septal defect visualized.

There is no left ventricular aneurysm.

There is no mass noted in the left ventricle.



 RIGHT VENTRICLE 

The right ventricle is normal size.

There is normal right ventricular wall thickness.

The right ventricular systolic function is normal.



 ATRIA 

The left atrium is mildly dilated.

The right atrium size is normal.

The interatrial septum is intact with no evidence for an atrial 

septal defect.



 AORTIC VALVE 

The aortic valve is calcified. 

Moderate to severe aortic regurgitation is present.

There is severe aortic valvular stenosis. Peak aortic velocity is - 4 

m/s and calculated AV area is < 1 cm2. 

There is no aortic valvular vegetation.



 MITRAL VALVE 

The mitral valve is normal in structure.

There is no evidence of mitral valve prolapse.

There is no mitral valve stenosis.

There is mild mitral valve regurgitation noted.



 TRICUSPID VALVE 

The tricuspid valve is normal in structure.

There is trace tricuspid valve regurgitation noted.

There is no tricuspid valve prolapse or vegetation.

There is no tricuspid valve stenosis.



 PULMONIC VALVE 

The pulmonary valve is normal in structure.

There is no pulmonic valvular regurgitation.

There is no pulmonic valvular stenosis.



 GREAT VESSELS 

The aortic root is normal in size.

The ascending aorta is normal in size.

The pulmonary artery is normal.

The IVC is normal in size and collapses >50% with inspiration.



 PERICARDIAL EFFUSION 

There is no pericardial effusion.

There is no pleural effusion.



<Conclusion>

The LV systolic function is mildly impaired.

The estimated ejection fraction is 45-50%

Transmitral Doppler flow pattern is Grade II-pseudonormal filling 

dynamics.

The left atrium is mildly dilated.

Moderate to severe aortic regurgitation is present.

There is severe aortic valvular stenosis. Peak aortic velocity is - 4 

m/s and calculated AV area is < 1 cm2.

There is mild mitral valve regurgitation noted.

There is trace tricuspid valve regurgitation noted.

## 2019-04-29 NOTE — CARD
--------------- APPROVED REPORT --------------





Date of service: 04/28/2019



EKG Measurement

Heart [id number]

NC 120P0

[id number]

[id number]

[id number]



<Conclusion>

Normal sinus rhythm with premature atrial complexes

Left axis deviation

Left ventricular hypertrophy with repolarization abnormality

Abnormal ECG

## 2019-04-29 NOTE — CARD
--------------- APPROVED REPORT --------------





Date of service: 04/29/2019



EKG Measurement

Heart [id number]

AZ 126P-2

[id number]

[id number]

[id number]



<Conclusion>

Normal sinus rhythm

Left axis deviation

Voltage criteria for left ventricular hypertrophy

Nonspecific T wave abnormality

Abnormal ECG

## 2019-04-30 VITALS — OXYGEN SATURATION: 97 % | HEART RATE: 69 BPM
